# Patient Record
Sex: MALE | Race: WHITE
[De-identification: names, ages, dates, MRNs, and addresses within clinical notes are randomized per-mention and may not be internally consistent; named-entity substitution may affect disease eponyms.]

---

## 2020-03-12 ENCOUNTER — HOSPITAL ENCOUNTER (EMERGENCY)
Dept: HOSPITAL 74 - JERFT | Age: 25
Discharge: HOME | End: 2020-03-12
Payer: COMMERCIAL

## 2020-03-12 VITALS — BODY MASS INDEX: 27.2 KG/M2

## 2020-03-12 VITALS — SYSTOLIC BLOOD PRESSURE: 116 MMHG | DIASTOLIC BLOOD PRESSURE: 70 MMHG | HEART RATE: 88 BPM | TEMPERATURE: 98.5 F

## 2020-03-12 DIAGNOSIS — J06.9: Primary | ICD-10-CM

## 2020-03-12 NOTE — PDOC
History of Present Illness





- General


Chief Complaint: Cold Symptoms


Stated Complaint: FEVER


Time Seen by Provider: 03/12/20 21:36


History Source: Patient


Exam Limitations: No Limitations





- History of Present Illness


Initial Comments: 





03/12/20 22:04


Chief complaint: Fever





Patient is a healthy 24-year-old male with 1 day of chills, fever, now starting 

to get nasal congestion.  Patient took Advil 600 mg, also took an antibiotic.  

Patient denies any cough or sore throat.  Patient is able to drink and eat.  

Patient does not look acutely ill





Review of systems Limited, developmentally as per mother in HPI





GENERAL: The patient is awake, alert, and fully oriented, in no acute distress.


HEAD: Normal with no signs of trauma.


EYES: Pupils equal, round and reactive to light, sclera anicteric, conjunctiva 

clear.


ENT: pharynx: no erythema, no exudate, uvula midline


NECK: supple


CHEST: clear, nontender, rr


ABD: soft, nontender


BACK: no tenderness or signs of injury


EXTREMITIES: Normal range of motion, no edema.


NEUROLOGICAL: Normal speech, normal gait.


SKIN: Warm, Dry





Past History





- Past Medical History


Allergies/Adverse Reactions: 


                                    Allergies











Allergy/AdvReac Type Severity Reaction Status Date / Time


 


No Known Allergies Allergy   Verified 03/12/20 20:32











COPD: No





- Psycho Social/Smoking Cessation Hx


Smoking History: Never smoked


Hx Alcohol Use: No


Drug/Substance Use Hx: No





*Physical Exam





- Vital Signs


                                Last Vital Signs











Temp Pulse Resp BP Pulse Ox


 


 98.5 F   88   19   116/70   100 


 


 03/12/20 20:29  03/12/20 20:29  03/12/20 20:29  03/12/20 20:29  03/12/20 20:29














Medical Decision Making





- Medical Decision Making





03/12/20 22:06


24-year-old male with 1 day of fever, nasal congestion who came to the ER mostly

because he was concerned regarding coronavirus.  Patient does not look acutely 

ill.  There is no further work-up indicated.  Patient given information 

regarding illness, information regarding coronavirus.  Patient screened 

negative.





Discussed issues, findings, results, applicable medications and treatments and 

follow-up. All these were understood and all questions were answered





Discharge





- Discharge Information


Problems reviewed: Yes


Clinical Impression/Diagnosis: 


Upper respiratory infection


Qualifiers:


 URI type: unspecified URI Qualified Code(s): J06.9 - Acute upper respiratory 

infection, unspecified





Condition: Stable


Disposition: HOME





- Admission


No





- Follow up/Referral





- Patient Discharge Instructions


Patient Printed Discharge Instructions:  DI for Viral Upper Respiratory 

Infection -- Adult


Additional Instructions: 


Drink 2-3 L of water daily





Take Tylenol 650 mg every 4 hours or Motrin 600 mg every 6 hours for fever and 

pain





Return to the nearest ER if short of breath, unable to swallow or feeling sicker





Followup with your doctor in one to 2 days





Covid-19 Symptoms and Knowing When to Stay Home and Return to Work


The following is the most recent guidance from our Infection Prevention and 

Control team on the symptoms and duration of Covid-19, along with when to stay 

home from work, when you may return, and what procedures to follow when you are 

ready to come back.








PLease call [facility] :  [facility] CORONAVIRUS HOTLINE:  [phone]


What are the most common symptoms of Covid-19?


- Muscle aches


- Loss of energy and appetite


- Persistent cough


- Low grade fever lasting 24 hours or more, causing the person to feel feverish 

with chills


If I have Covid-19, how long can I expect to feel sick?


- Typically one week.


- The majority of individuals feel better in 5 to 7 days with rest and 

over-the-counter cold and flu medications.


How does illness progress in cases of Covid-19?


- A few individuals progress to pneumonia (infection of the lungs) and/or 

pneumonitis (inflammation of the lungs).


- Pneumonia/pneumonitis causes shortness of breath, worsening cough and in most 

cases, fever.


- Individuals with the symptoms of Covid-19 who develop a worsening cough and 

shortness of breath must seek care quickly.





If Im concerned about my symptoms or feel unwell, when must I stay home from 

work/ school?


If you have muscle aches, cough, fatigue and low-grade fever, do not go to work/

school





- Post Discharge Activity


Work/Back to School Note:  Back to Work